# Patient Record
Sex: MALE | Race: WHITE | Employment: OTHER | URBAN - METROPOLITAN AREA
[De-identification: names, ages, dates, MRNs, and addresses within clinical notes are randomized per-mention and may not be internally consistent; named-entity substitution may affect disease eponyms.]

---

## 2022-03-19 PROBLEM — I10 ESSENTIAL HYPERTENSION: Status: ACTIVE | Noted: 2018-01-03

## 2022-06-29 RX ORDER — CLOPIDOGREL BISULFATE 75 MG/1
TABLET ORAL
Qty: 90 TABLET | Refills: 0 | Status: SHIPPED | OUTPATIENT
Start: 2022-06-29 | End: 2022-07-27 | Stop reason: SDUPTHER

## 2022-07-27 ENCOUNTER — OFFICE VISIT (OUTPATIENT)
Dept: CARDIOLOGY CLINIC | Age: 87
End: 2022-07-27
Payer: MEDICARE

## 2022-07-27 VITALS
HEART RATE: 82 BPM | BODY MASS INDEX: 23.12 KG/M2 | HEIGHT: 64 IN | SYSTOLIC BLOOD PRESSURE: 128 MMHG | WEIGHT: 135.4 LBS | DIASTOLIC BLOOD PRESSURE: 64 MMHG

## 2022-07-27 DIAGNOSIS — I10 ESSENTIAL HYPERTENSION: ICD-10-CM

## 2022-07-27 DIAGNOSIS — I25.10 ASCVD (ARTERIOSCLEROTIC CARDIOVASCULAR DISEASE): Primary | ICD-10-CM

## 2022-07-27 DIAGNOSIS — E78.00 PURE HYPERCHOLESTEROLEMIA: ICD-10-CM

## 2022-07-27 PROCEDURE — 1036F TOBACCO NON-USER: CPT | Performed by: INTERNAL MEDICINE

## 2022-07-27 PROCEDURE — G8420 CALC BMI NORM PARAMETERS: HCPCS | Performed by: INTERNAL MEDICINE

## 2022-07-27 PROCEDURE — 93000 ELECTROCARDIOGRAM COMPLETE: CPT | Performed by: INTERNAL MEDICINE

## 2022-07-27 PROCEDURE — 1123F ACP DISCUSS/DSCN MKR DOCD: CPT | Performed by: INTERNAL MEDICINE

## 2022-07-27 PROCEDURE — G8428 CUR MEDS NOT DOCUMENT: HCPCS | Performed by: INTERNAL MEDICINE

## 2022-07-27 PROCEDURE — 99214 OFFICE O/P EST MOD 30 MIN: CPT | Performed by: INTERNAL MEDICINE

## 2022-07-27 RX ORDER — GALANTAMINE HYDROBROMIDE 8 MG/1
8 CAPSULE, EXTENDED RELEASE ORAL
COMMUNITY
Start: 2022-05-26

## 2022-07-27 RX ORDER — LISINOPRIL 5 MG/1
5 TABLET ORAL DAILY
Qty: 90 TABLET | Refills: 2 | Status: SHIPPED | OUTPATIENT
Start: 2022-07-27

## 2022-07-27 RX ORDER — CLOPIDOGREL BISULFATE 75 MG/1
75 TABLET ORAL DAILY
Qty: 90 TABLET | Refills: 3 | Status: SHIPPED | OUTPATIENT
Start: 2022-07-27

## 2022-07-27 RX ORDER — NITROGLYCERIN 0.4 MG/1
0.4 TABLET SUBLINGUAL EVERY 5 MIN PRN
Qty: 25 TABLET | Refills: 11 | Status: SHIPPED | OUTPATIENT
Start: 2022-07-27

## 2022-07-27 NOTE — PROGRESS NOTES
[facility] CARDIOLOGY  [address] [facility], [address] [facility], [address] [place] [address]  PHONE: [phone]    NAME: Georgiana Corado  : 1935     HPI  Georgiana Corado is a 81 y.o. male seen for a follow up visit regarding   Hypertension    He is doing well with no chest pains or dyspnea  . He remains active around the home. He and wife note some excessive bleeding at times . Past Medical History, Past Surgical History, Family history, Social History, and Medications were all reviewed with the patient today and updated as necessary. [unfilled]  No Known Allergies  Past Medical History:   Diagnosis Date    Abnormal EKG     Angina pectoris (HCC)     ASCVD (arteriosclerotic cardiovascular disease)     Diabetes ([facility] [place] [address]     Hoarseness     Hyperlipidemia      Past Surgical History:   Procedure Laterality Date    CORONARY ARTERY BYPASS GRAFT       No family history on file. Social History     Tobacco Use    Smoking status: Former     Types: Cigarettes     Quit date: 1969     Years since quittin.6    Smokeless tobacco: Never   Substance Use Topics    Alcohol use: No     Prior to Admission medications    Medication Sig Start Date End Date Taking? Authorizing Provider   galantamine (RAZADYNE ER) 8 MG extended release capsule 8 mg 22  Yes Historical Provider, MD   nitroGLYCERIN (NITROSTAT) 0.4 MG SL tablet Place 1 tablet under the tongue every 5 minutes as needed for Chest pain 22  Yes Atul Lara MD   lisinopril (PRINIVIL;ZESTRIL) 5 MG tablet Take 1 tablet by mouth in the morning. Take 1 tablet by mouth once daily.  22  Yes Atul Lara MD   clopidogrel (PLAVIX) 75 MG tablet Take 1 tablet by mouth in the morning. 22  Yes Atul Lara MD   aspirin 81 MG chewable tablet Take 81 mg by mouth daily   Yes Ar Automatic Reconciliation   atorvastatin (LIPITOR) 40 MG tablet Take 80 mg by mouth daily   Yes Ar Automatic Reconciliation   magnesium oxide (MAG-OX) 400 MG tablet Take 400 mg by mouth daily   Yes Ar Automatic Reconciliation   metFORMIN (GLUCOPHAGE) 500 MG tablet Take by mouth daily (with breakfast)   Yes Ar Automatic Reconciliation   potassium gluconate 550 mg tablet Take 99 mg by mouth daily   Yes Ar Automatic Reconciliation         Review of Systems              Wt Readings from Last 3 Encounters:   07/27/22 135 lb 6.4 oz (61.4 kg)   07/27/21 143 lb 9.6 oz (65.1 kg)     BP Readings from Last 3 Encounters:   07/27/22 128/64   07/27/21 122/70       /64   Pulse 82   Ht 5' 4\" (1.626 m)   Wt 135 lb 6.4 oz (61.4 kg)   BMI 23.24 kg/m²       Physical Exam  Constitutional:       Appearance: Normal appearance. Neck:      Vascular: No carotid bruit. Cardiovascular:      Rate and Rhythm: Normal rate and regular rhythm. Heart sounds: Normal heart sounds. Abdominal:      General: Bowel sounds are normal.      Palpations: Abdomen is soft. Skin:     General: Skin is warm and dry. Neurological:      Mental Status: He is alert. EKG-Sinus  Rhythm 82 bpm  -Incomplete right bundle branch block. Medical problems and test results were reviewed with the patient today. No results found for any visits on 07/27/22. No results found for: HBA1C, VTG3AQMQ    No results found for: WBC, WBCLT, HGBPOC, HGB, HGBP, HCTPOC, HCT, PHCT, PLT, MCV    No results found for: NA, K, CL, CO2, AGAP, GLU, BUN, CREA, GFRAA, CA, GFRAA    No results found for: ALT, GGT, GGTP, APIT, APX    No results found for: CHOL, CHOLPOCT, CHOLX, CHLST, CHOLV, HDL, HDLPOC, HDLC, LDL, LDLC, VLDLC, VLDL, TGLX, TRIGL      ASSESSMENT and PLAN    [facility] was seen today for hypertension.     Diagnoses and all orders for this visit:    ASCVD (arteriosclerotic cardiovascular disease)  no current symptoms    may reduce DAPT to just Plavix 75mg a day   Comments:  CABG 1992  SVG stents 2006  Orders:  -     EKG 12 lead  -     nitroGLYCERIN (NITROSTAT) 0.4 MG SL tablet; Place 1 tablet under the tongue every 5 minutes as needed for Chest pain  -     clopidogrel (PLAVIX) 75 MG tablet; Take 1 tablet by mouth in the morning. Essential hypertension  controlled on current medications   -     EKG 12 lead  -     lisinopril (PRINIVIL;ZESTRIL) 5 MG tablet; Take 1 tablet by mouth in the morning. Take 1 tablet by mouth once daily.     Pure hypercholesterolemia lipids reasonable on Lipitor 40mg a day       no change                               Zenobia Virk MD  7/27/2022  9:45 AM

## 2023-08-29 ENCOUNTER — OFFICE VISIT (OUTPATIENT)
Age: 88
End: 2023-08-29
Payer: MEDICARE

## 2023-08-29 VITALS
SYSTOLIC BLOOD PRESSURE: 110 MMHG | DIASTOLIC BLOOD PRESSURE: 60 MMHG | HEART RATE: 73 BPM | WEIGHT: 131 LBS | BODY MASS INDEX: 22.36 KG/M2 | HEIGHT: 64 IN

## 2023-08-29 DIAGNOSIS — I25.810 CORONARY ARTERY DISEASE INVOLVING CORONARY BYPASS GRAFT OF NATIVE HEART WITHOUT ANGINA PECTORIS: Primary | ICD-10-CM

## 2023-08-29 DIAGNOSIS — I10 ESSENTIAL HYPERTENSION: ICD-10-CM

## 2023-08-29 DIAGNOSIS — Z95.1 S/P CABG X 4: ICD-10-CM

## 2023-08-29 DIAGNOSIS — E78.2 MIXED HYPERLIPIDEMIA: ICD-10-CM

## 2023-08-29 PROCEDURE — 1123F ACP DISCUSS/DSCN MKR DOCD: CPT | Performed by: INTERNAL MEDICINE

## 2023-08-29 PROCEDURE — 93000 ELECTROCARDIOGRAM COMPLETE: CPT | Performed by: INTERNAL MEDICINE

## 2023-08-29 PROCEDURE — G8428 CUR MEDS NOT DOCUMENT: HCPCS | Performed by: INTERNAL MEDICINE

## 2023-08-29 PROCEDURE — 99214 OFFICE O/P EST MOD 30 MIN: CPT | Performed by: INTERNAL MEDICINE

## 2023-08-29 PROCEDURE — G8420 CALC BMI NORM PARAMETERS: HCPCS | Performed by: INTERNAL MEDICINE

## 2023-08-29 PROCEDURE — 1036F TOBACCO NON-USER: CPT | Performed by: INTERNAL MEDICINE

## 2023-08-29 RX ORDER — NITROGLYCERIN 0.4 MG/1
0.4 TABLET SUBLINGUAL EVERY 5 MIN PRN
Qty: 25 TABLET | Refills: 11 | Status: SHIPPED | OUTPATIENT
Start: 2023-08-29

## 2023-08-29 RX ORDER — CLOPIDOGREL BISULFATE 75 MG/1
75 TABLET ORAL DAILY
Qty: 90 TABLET | Refills: 3 | Status: SHIPPED | OUTPATIENT
Start: 2023-08-29

## 2023-08-29 RX ORDER — ASPIRIN 81 MG/1
81 TABLET, CHEWABLE ORAL EVERY OTHER DAY
Qty: 30 TABLET | Status: SHIPPED | COMMUNITY
Start: 2023-08-29

## 2023-08-29 ASSESSMENT — ENCOUNTER SYMPTOMS
WHEEZING: 0
STRIDOR: 0
DOUBLE VISION: 0
HEMATOCHEZIA: 0
ABDOMINAL PAIN: 0
EYE REDNESS: 0
HOARSE VOICE: 0
HEMOPTYSIS: 0
HEMATEMESIS: 0

## 2023-08-29 NOTE — PROGRESS NOTES
[facility] CARDIOLOGY  [id number] [place] [address] [facility]  PHONE: [phone]          23    NAME:  Balta Dueñas  : 1935  MRN: [id number]         SUBJECTIVE:   Balta Dueñas is a 81 y.o. male seen for a visit regarding the following:     Chief Complaint   Patient presents with    Coronary Artery Disease           HPI:    Cardio problem list:  1. Coronary artery disease s/p CABG X4-reported  , stents in    -Nuclear stress test-2011-achieved 13.5 METS, no inducible ischemia small fixed inferior/ lateral defect, EF 61% moderate inferior and lateral hypokinesis  -[facility]--EF 55%, occluded LAD, occluded proximal circumflex, RCA with multiple 90% lesions after mid vessel 100% occluded PDA SVG to circumflex marginal patent SVG to LAD was patent, LIMA to ramus intermedius-widely patent SVG to RCA had several 30 to 40% narrowings. 2.  Hypertension  3. Hyperlipidemia  4. Type 2 diabetes mellitus    -Previous patient of Dr. Erica Terry    I saw . Amelia Youssef who is a pleasant 52-year-old gentleman in cardiovascular follow-up for CAD s/p CABG, hypertension, hyperlipidemia with known underlying type 2 diabetes mellitus. He saw Dr. Erica Terry a year ago at which time no changes were made with his medical therapy. CAD: No complaints of any angina. Remains very active. Uses a push mower. Has had a lot of issues with skin cancers have told him to stay out of the sun. No complaints of any worsening dyspnea on exertion orthopnea PND or lower extremity edema    Hypertension: Well-controlled on current therapy denies any headaches blurry vision    Hyperlipidemia-remains on high intensity statin therapy with atorvastatin 80 mg daily and is tolerating this well. Past Medical History, Past Surgical History, Family history, Social History, and Medications were all reviewed with the patient today and updated as necessary.      No Known Allergies  Patient Active Problem List

## 2024-10-20 DIAGNOSIS — I25.810 CORONARY ARTERY DISEASE INVOLVING CORONARY BYPASS GRAFT OF NATIVE HEART WITHOUT ANGINA PECTORIS: ICD-10-CM

## 2024-10-21 RX ORDER — CLOPIDOGREL BISULFATE 75 MG/1
75 TABLET ORAL DAILY
Qty: 90 TABLET | Refills: 0 | Status: SHIPPED | OUTPATIENT
Start: 2024-10-21

## 2024-10-22 ENCOUNTER — TELEPHONE (OUTPATIENT)
Age: 89
End: 2024-10-22

## 2024-10-22 NOTE — TELEPHONE ENCOUNTER
MEDICATION REFILL REQUEST      Name of Medication:  Clopidogrel  Dose:  75mg  Frequency:  once daily  Quantity:  30  Days' supply:  90      Pharmacy Name/Location:  [facility] [address] [facility]

## 2024-11-21 ENCOUNTER — OFFICE VISIT (OUTPATIENT)
Age: 89
End: 2024-11-21

## 2024-11-21 VITALS
BODY MASS INDEX: 23.56 KG/M2 | SYSTOLIC BLOOD PRESSURE: 118 MMHG | HEART RATE: 74 BPM | DIASTOLIC BLOOD PRESSURE: 66 MMHG | HEIGHT: 64 IN | WEIGHT: 138 LBS

## 2024-11-21 DIAGNOSIS — I25.810 CORONARY ARTERY DISEASE INVOLVING CORONARY BYPASS GRAFT OF NATIVE HEART WITHOUT ANGINA PECTORIS: Primary | ICD-10-CM

## 2024-11-21 DIAGNOSIS — E78.2 MIXED HYPERLIPIDEMIA: ICD-10-CM

## 2024-11-21 DIAGNOSIS — Z95.1 S/P CABG X 4: ICD-10-CM

## 2024-11-21 DIAGNOSIS — I10 ESSENTIAL HYPERTENSION: ICD-10-CM

## 2024-11-21 RX ORDER — LATANOPROST 50 UG/ML
1 SOLUTION/ DROPS OPHTHALMIC NIGHTLY
COMMUNITY

## 2024-11-21 RX ORDER — LANOLIN ALCOHOL/MO/W.PET/CERES
400 CREAM (GRAM) TOPICAL DAILY
COMMUNITY

## 2024-11-21 RX ORDER — DORZOLAMIDE HYDROCHLORIDE AND TIMOLOL MALEATE 20; 5 MG/ML; MG/ML
1 SOLUTION/ DROPS OPHTHALMIC 2 TIMES DAILY
COMMUNITY

## 2024-11-21 RX ORDER — CLOPIDOGREL BISULFATE 75 MG/1
75 TABLET ORAL DAILY
Qty: 90 TABLET | Refills: 3 | Status: SHIPPED | OUTPATIENT
Start: 2024-11-21

## 2024-11-21 RX ORDER — MAGNESIUM GLUCONATE 30 MG(550)
TABLET ORAL
COMMUNITY

## 2024-11-21 ASSESSMENT — ENCOUNTER SYMPTOMS
WHEEZING: 0
HEMATOCHEZIA: 0
EYE REDNESS: 0
HEMOPTYSIS: 0
STRIDOR: 0
ABDOMINAL PAIN: 0
DOUBLE VISION: 0
HOARSE VOICE: 0
HEMATEMESIS: 0

## 2024-11-21 NOTE — PROGRESS NOTES
itching and rash.   Musculoskeletal:  Negative for joint swelling and muscle weakness.   Gastrointestinal:  Negative for abdominal pain, hematemesis and hematochezia.   Genitourinary:  Negative for flank pain and nocturia.   Neurological:  Negative for focal weakness and seizures.   Psychiatric/Behavioral:  Negative for altered mental status and suicidal ideas.    Allergic/Immunologic: Negative for hives.       PHYSICAL EXAM:    /66   Pulse 74   Ht 1.626 m (5' 4\")   Wt 62.6 kg (138 lb)   BMI 23.69 kg/m²      Physical Exam    General: Alert and oriented in no acute distress  HEENT: Head is normocephalic, atraumatic, pupils are equal bilaterally, throat appears to be clear  Neck: No significant jugular venous distention no cervical bruits  Cardiovascular: S1 and S2 heard, regular rate and rhythm, no significant murmurs rubs or gallops.   Respiratory: Clear to auscultation bilaterally with no adventitious sounds, respirations are normal  Abdomen: Soft, nontender, nondistended, bowel sounds present.  Extremities: No cyanosis clubbing or edema  Peripheral pulses: Bilateral radial artery pulses are palpated.  Bilateral pedal pulses are well felt.  Neuro: No facial droop and no gross focal motor deficits  Lymphatic: No significant cervical lymphadenopathy noted.  Musculoskeletal: No significant redness or swelling noted in all exposed joints.  Skin: No significant rashes noted the of the exposed regions.       Outside labs reviewed-  -A1c from June 2023 was 6.2, blood work from February 2023 was good with a hemoglobin of 13.4, platelets 196, BUN 17, creatinine 0.93, potassium 4.4, magnesium 2.0  Lipids from July 2020 showed a total cholesterol of 144, triglycerides 84, HDL 41 and LDL 86    Blood work from 11/7/2024 showed a hemoglobin of 14.2 platelets were 181 BUN 18 creatinine 1.1 potassium 4.8 albumin 4.3 AST 25 ALT 14 A1c 6.6      -EKG from 8/29/2023 showed sinus rhythm with first-degree AV block, nonspecific